# Patient Record
Sex: MALE | Race: WHITE | NOT HISPANIC OR LATINO | Employment: FULL TIME | URBAN - METROPOLITAN AREA
[De-identification: names, ages, dates, MRNs, and addresses within clinical notes are randomized per-mention and may not be internally consistent; named-entity substitution may affect disease eponyms.]

---

## 2018-01-28 ENCOUNTER — APPOINTMENT (OUTPATIENT)
Dept: CT IMAGING | Facility: HOSPITAL | Age: 53
End: 2018-01-28

## 2018-01-28 ENCOUNTER — HOSPITAL ENCOUNTER (EMERGENCY)
Facility: HOSPITAL | Age: 53
Discharge: HOME | End: 2018-01-28
Attending: FAMILY MEDICINE | Admitting: FAMILY MEDICINE

## 2018-01-28 VITALS
BODY MASS INDEX: 24.5 KG/M2 | OXYGEN SATURATION: 94 % | HEART RATE: 69 BPM | WEIGHT: 175 LBS | HEIGHT: 71 IN | SYSTOLIC BLOOD PRESSURE: 121 MMHG | TEMPERATURE: 98.7 F | DIASTOLIC BLOOD PRESSURE: 90 MMHG | RESPIRATION RATE: 16 BRPM

## 2018-01-28 DIAGNOSIS — R51.9 SCALP PAIN: Primary | ICD-10-CM

## 2018-01-28 DIAGNOSIS — R51.9 NONINTRACTABLE HEADACHE, UNSPECIFIED CHRONICITY PATTERN, UNSPECIFIED HEADACHE TYPE: ICD-10-CM

## 2018-01-28 LAB — GLUCOSE BLDC GLUCOMTR-MCNC: 176 MG/DL (ref 70–130)

## 2018-01-28 PROCEDURE — 70450 CT HEAD/BRAIN W/O DYE: CPT

## 2018-01-28 PROCEDURE — 82962 GLUCOSE BLOOD TEST: CPT

## 2018-01-28 PROCEDURE — 99283 EMERGENCY DEPT VISIT LOW MDM: CPT

## 2018-01-28 RX ORDER — CLONIDINE HYDROCHLORIDE 0.2 MG/1
0.2 TABLET ORAL 2 TIMES DAILY
COMMUNITY

## 2018-01-28 RX ORDER — INSULIN GLARGINE 100 [IU]/ML
INJECTION, SOLUTION SUBCUTANEOUS DAILY
COMMUNITY

## 2018-01-28 RX ORDER — CLONIDINE HYDROCHLORIDE 0.1 MG/1
0.1 TABLET ORAL ONCE
Status: COMPLETED | OUTPATIENT
Start: 2018-01-28 | End: 2018-01-28

## 2018-01-28 RX ORDER — GLIPIZIDE 10 MG/1
10 TABLET, FILM COATED, EXTENDED RELEASE ORAL DAILY
COMMUNITY

## 2018-01-28 RX ADMIN — CLONIDINE HYDROCHLORIDE 0.1 MG: 0.1 TABLET ORAL at 11:06

## 2018-12-10 ENCOUNTER — OFFICE VISIT (OUTPATIENT)
Dept: RETAIL CLINIC | Facility: CLINIC | Age: 53
End: 2018-12-10

## 2018-12-10 VITALS
RESPIRATION RATE: 16 BRPM | OXYGEN SATURATION: 97 % | TEMPERATURE: 98.7 F | DIASTOLIC BLOOD PRESSURE: 94 MMHG | HEART RATE: 91 BPM | SYSTOLIC BLOOD PRESSURE: 168 MMHG

## 2018-12-10 DIAGNOSIS — J40 BRONCHITIS: ICD-10-CM

## 2018-12-10 DIAGNOSIS — J32.1 FRONTAL SINUSITIS, UNSPECIFIED CHRONICITY: Primary | ICD-10-CM

## 2018-12-10 DIAGNOSIS — J32.0 MAXILLARY SINUSITIS, UNSPECIFIED CHRONICITY: ICD-10-CM

## 2018-12-10 PROCEDURE — 99203 OFFICE O/P NEW LOW 30 MIN: CPT | Performed by: NURSE PRACTITIONER

## 2018-12-10 RX ORDER — DOXYCYCLINE HYCLATE 100 MG
100 TABLET ORAL 2 TIMES DAILY
Qty: 20 TABLET | Refills: 0 | Status: SHIPPED | OUTPATIENT
Start: 2018-12-10 | End: 2018-12-20

## 2018-12-10 RX ORDER — ALBUTEROL SULFATE 90 UG/1
2 AEROSOL, METERED RESPIRATORY (INHALATION) EVERY 4 HOURS PRN
Qty: 6.7 G | Refills: 0 | Status: SHIPPED | OUTPATIENT
Start: 2018-12-10

## 2018-12-10 RX ORDER — METHYLPREDNISOLONE 4 MG/1
TABLET ORAL
Qty: 1 EACH | Refills: 0 | Status: SHIPPED | OUTPATIENT
Start: 2018-12-10

## 2018-12-10 NOTE — PROGRESS NOTES
Subjective:     Sergio Stanford is a 53 y.o.     Patient reports that wife has been sick in the last week with Bronchitis.        Sinusitis   This is a new problem. The current episode started in the past 7 days. The problem has been gradually worsening since onset. Associated symptoms include congestion, coughing, diaphoresis and sinus pressure. (Sweats, chest tightness) Past treatments include acetaminophen and oral decongestants. The treatment provided no relief.         The following portions of the patient's history were reviewed and updated as appropriate: allergies, current medications, past family history, past medical history, past social history, past surgical history and problem list.      Review of Systems   Constitutional: Positive for diaphoresis and fatigue.   HENT: Positive for congestion and sinus pressure.    Respiratory: Positive for cough.    Cardiovascular: Negative.          Objective:    Vitals:    12/10/18 1648   BP: 168/94   Pulse: 91   Resp: 16   Temp: 98.7 °F (37.1 °C)   SpO2: 97%       Physical Exam   Constitutional: He is oriented to person, place, and time. He appears well-developed and well-nourished. He has a sickly appearance.   HENT:   Head: Normocephalic.   Right Ear: No tenderness. Tympanic membrane is bulging. Tympanic membrane is not erythematous.   Left Ear: No tenderness. Tympanic membrane is bulging. Tympanic membrane is not erythematous.   Nose: Right sinus exhibits maxillary sinus tenderness and frontal sinus tenderness. Left sinus exhibits maxillary sinus tenderness and frontal sinus tenderness.   Mouth/Throat: Oropharynx is clear and moist and mucous membranes are normal.   Eyes: Conjunctivae are normal.   Cardiovascular: Normal rate, regular rhythm and normal heart sounds.   Pulmonary/Chest: Effort normal. He has wheezes.   Neurological: He is alert and oriented to person, place, and time.   Skin: Skin is warm and dry.   Psychiatric: He has a normal mood and affect. His  behavior is normal. Judgment and thought content normal.   Vitals reviewed.    Sergio is instructed to follow up with primary care if his symptoms do not improve, worsen, or new symptoms develop.      Sergio was seen today for sinusitis.    Diagnoses and all orders for this visit:    Frontal sinusitis, unspecified chronicity    Maxillary sinusitis, unspecified chronicity    Bronchitis    Other orders  -     MethylPREDNISolone (MEDROL, LOUIE,) 4 MG tablet; Take as directed on package instructions.  -     doxycycline (VIBRAMYICN) 100 MG tablet; Take 1 tablet by mouth 2 (Two) Times a Day for 10 days.  -     albuterol 108 (90 Base) MCG/ACT inhaler; Inhale 2 puffs Every 4 (Four) Hours As Needed for Wheezing or Shortness of Air.

## 2018-12-10 NOTE — PATIENT INSTRUCTIONS
Sinusitis, Adult  Sinusitis is soreness and inflammation of your sinuses. Sinuses are hollow spaces in the bones around your face. Your sinuses are located:  · Around your eyes.  · In the middle of your forehead.  · Behind your nose.  · In your cheekbones.    Your sinuses and nasal passages are lined with a stringy fluid (mucus). Mucus normally drains out of your sinuses. When your nasal tissues become inflamed or swollen, the mucus can become trapped or blocked so air cannot flow through your sinuses. This allows bacteria, viruses, and funguses to grow, which leads to infection.  Sinusitis can develop quickly and last for 7?10 days (acute) or for more than 12 weeks (chronic). Sinusitis often develops after a cold.  What are the causes?  This condition is caused by anything that creates swelling in the sinuses or stops mucus from draining, including:  · Allergies.  · Asthma.  · Bacterial or viral infection.  · Abnormally shaped bones between the nasal passages.  · Nasal growths that contain mucus (nasal polyps).  · Narrow sinus openings.  · Pollutants, such as chemicals or irritants in the air.  · A foreign object stuck in the nose.  · A fungal infection. This is rare.    What increases the risk?  The following factors may make you more likely to develop this condition:  · Having allergies or asthma.  · Having had a recent cold or respiratory tract infection.  · Having structural deformities or blockages in your nose or sinuses.  · Having a weak immune system.  · Doing a lot of swimming or diving.  · Overusing nasal sprays.  · Smoking.    What are the signs or symptoms?  The main symptoms of this condition are pain and a feeling of pressure around the affected sinuses. Other symptoms include:  · Upper toothache.  · Earache.  · Headache.  · Bad breath.  · Decreased sense of smell and taste.  · A cough that may get worse at night.  · Fatigue.  · Fever.  · Thick drainage from your nose. The drainage is often green and  it may contain pus (purulent).  · Stuffy nose or congestion.  · Postnasal drip. This is when extra mucus collects in the throat or back of the nose.  · Swelling and warmth over the affected sinuses.  · Sore throat.  · Sensitivity to light.    How is this diagnosed?  This condition is diagnosed based on symptoms, a medical history, and a physical exam. To find out if your condition is acute or chronic, your health care provider may:  · Look in your nose for signs of nasal polyps.  · Tap over the affected sinus to check for signs of infection.  · View the inside of your sinuses using an imaging device that has a light attached (endoscope).    If your health care provider suspects that you have chronic sinusitis, you may also:  · Be tested for allergies.  · Have a sample of mucus taken from your nose (nasal culture) and checked for bacteria.  · Have a mucus sample examined to see if your sinusitis is related to an allergy.    If your sinusitis does not respond to treatment and it lasts longer than 8 weeks, you may have an MRI or CT scan to check your sinuses. These scans also help to determine how severe your infection is.  In rare cases, a bone biopsy may be done to rule out more serious types of fungal sinus disease.  How is this treated?  Treatment for sinusitis depends on the cause and whether your condition is chronic or acute. If a virus is causing your sinusitis, your symptoms will go away on their own within 10 days. You may be given medicines to relieve your symptoms, including:  · Topical nasal decongestants. They shrink swollen nasal passages and let mucus drain from your sinuses.  · Antihistamines. These drugs block inflammation that is triggered by allergies. This can help to ease swelling in your nose and sinuses.  · Topical nasal corticosteroids. These are nasal sprays that ease inflammation and swelling in your nose and sinuses.  · Nasal saline washes. These rinses can help to get rid of thick mucus in  your nose.    If your condition is caused by bacteria, you will be given an antibiotic medicine. If your condition is caused by a fungus, you will be given an antifungal medicine.  Surgery may be needed to correct underlying conditions, such as narrow nasal passages. Surgery may also be needed to remove polyps.  Follow these instructions at home:  Medicines  · Take, use, or apply over-the-counter and prescription medicines only as told by your health care provider. These may include nasal sprays.  · If you were prescribed an antibiotic medicine, take it as told by your health care provider. Do not stop taking the antibiotic even if you start to feel better.  Hydrate and Humidify  · Drink enough water to keep your urine clear or pale yellow. Staying hydrated will help to thin your mucus.  · Use a cool mist humidifier to keep the humidity level in your home above 50%.  · Inhale steam for 10-15 minutes, 3-4 times a day or as told by your health care provider. You can do this in the bathroom while a hot shower is running.  · Limit your exposure to cool or dry air.  Rest  · Rest as much as possible.  · Sleep with your head raised (elevated).  · Make sure to get enough sleep each night.  General instructions  · Apply a warm, moist washcloth to your face 3-4 times a day or as told by your health care provider. This will help with discomfort.  · Wash your hands often with soap and water to reduce your exposure to viruses and other germs. If soap and water are not available, use hand .  · Do not smoke. Avoid being around people who are smoking (secondhand smoke).  · Keep all follow-up visits as told by your health care provider. This is important.  Contact a health care provider if:  · You have a fever.  · Your symptoms get worse.  · Your symptoms do not improve within 10 days.  Get help right away if:  · You have a severe headache.  · You have persistent vomiting.  · You have pain or swelling around your face or  eyes.  · You have vision problems.  · You develop confusion.  · Your neck is stiff.  · You have trouble breathing.  This information is not intended to replace advice given to you by your health care provider. Make sure you discuss any questions you have with your health care provider.  Document Released: 12/18/2006 Document Revised: 08/13/2017 Document Reviewed: 10/12/2016  [employer] Interactive Patient Education © 2018 [employer]  Acute Bronchitis, Adult  Acute bronchitis is sudden (acute) swelling of the air tubes (bronchi) in the lungs. Acute bronchitis causes these tubes to fill with mucus, which can make it hard to breathe. It can also cause coughing or wheezing.  In adults, acute bronchitis usually goes away within 2 weeks. A cough caused by bronchitis may last up to 3 weeks. Smoking, allergies, and asthma can make the condition worse. Repeated episodes of bronchitis may cause further lung problems, such as chronic obstructive pulmonary disease (COPD).  What are the causes?  This condition can be caused by germs and by substances that irritate the lungs, including:  · Cold and flu viruses. This condition is most often caused by the same virus that causes a cold.  · Bacteria.  · Exposure to tobacco smoke, dust, fumes, and air pollution.    What increases the risk?  This condition is more likely to develop in people who:  · Have close contact with someone with acute bronchitis.  · Are exposed to lung irritants, such as tobacco smoke, dust, fumes, and vapors.  · Have a weak immune system.  · Have a respiratory condition such as asthma.    What are the signs or symptoms?  Symptoms of this condition include:  · A cough.  · Coughing up clear, yellow, or green mucus.  · Wheezing.  · Chest congestion.  · Shortness of breath.  · A fever.  · Body aches.  · Chills.  · A sore throat.    How is this diagnosed?  This condition is usually diagnosed with a physical exam. During the exam, your health care provider may order  tests, such as chest X-rays, to rule out other conditions. He or she may also:  · Test a sample of your mucus for bacterial infection.  · Check the level of oxygen in your blood. This is done to check for pneumonia.  · Do a chest X-ray or lung function testing to rule out pneumonia and other conditions.  · Perform blood tests.    Your health care provider will also ask about your symptoms and medical history.  How is this treated?  Most cases of acute bronchitis clear up over time without treatment. Your health care provider may recommend:  · Drinking more fluids. Drinking more makes your mucus thinner, which may make it easier to breathe.  · Taking a medicine for a fever or cough.  · Taking an antibiotic medicine.  · Using an inhaler to help improve shortness of breath and to control a cough.  · Using a cool mist vaporizer or humidifier to make it easier to breathe.    Follow these instructions at home:  Medicines  · Take over-the-counter and prescription medicines only as told by your health care provider.  · If you were prescribed an antibiotic, take it as told by your health care provider. Do not stop taking the antibiotic even if you start to feel better.  General instructions  · Get plenty of rest.  · Drink enough fluids to keep your urine clear or pale yellow.  · Avoid smoking and secondhand smoke. Exposure to cigarette smoke or irritating chemicals will make bronchitis worse. If you smoke and you need help quitting, ask your health care provider. Quitting smoking will help your lungs heal faster.  · Use an inhaler, cool mist vaporizer, or humidifier as told by your health care provider.  · Keep all follow-up visits as told by your health care provider. This is important.  How is this prevented?  To lower your risk of getting this condition again:  · Wash your hands often with soap and water. If soap and water are not available, use hand .  · Avoid contact with people who have cold symptoms.  · Try  not to touch your hands to your mouth, nose, or eyes.  · Make sure to get the flu shot every year.    Contact a health care provider if:  · Your symptoms do not improve in 2 weeks of treatment.  Get help right away if:  · You cough up blood.  · You have chest pain.  · You have severe shortness of breath.  · You become dehydrated.  · You faint or keep feeling like you are going to faint.  · You keep vomiting.  · You have a severe headache.  · Your fever or chills gets worse.  This information is not intended to replace advice given to you by your health care provider. Make sure you discuss any questions you have with your health care provider.  Document Released: 01/25/2006 Document Revised: 07/12/2017 Document Reviewed: 06/07/2017  Elsevier Interactive Patient Education © 2018 [employer]vier Inc.

## 2023-12-11 ENCOUNTER — TELEPHONE (OUTPATIENT)
Dept: GASTROENTEROLOGY | Facility: CLINIC | Age: 58
End: 2023-12-11
Payer: COMMERCIAL

## 2023-12-11 NOTE — TELEPHONE ENCOUNTER
Got a message from [facility] today that the patient wanted to schedule a screening colonoscopy and I called and left a message

## 2025-03-24 ENCOUNTER — OFFICE VISIT (OUTPATIENT)
Dept: ENDOCRINOLOGY | Age: 60
End: 2025-03-24
Payer: COMMERCIAL

## 2025-03-24 VITALS
HEART RATE: 109 BPM | TEMPERATURE: 97.6 F | BODY MASS INDEX: 23.07 KG/M2 | DIASTOLIC BLOOD PRESSURE: 70 MMHG | OXYGEN SATURATION: 94 % | SYSTOLIC BLOOD PRESSURE: 132 MMHG | WEIGHT: 164.8 LBS | HEIGHT: 71 IN

## 2025-03-24 DIAGNOSIS — E78.2 MIXED HYPERLIPIDEMIA: ICD-10-CM

## 2025-03-24 DIAGNOSIS — E11.42 DIABETIC PERIPHERAL NEUROPATHY: ICD-10-CM

## 2025-03-24 DIAGNOSIS — E11.65 TYPE 2 DIABETES MELLITUS WITH HYPERGLYCEMIA, WITH LONG-TERM CURRENT USE OF INSULIN: Primary | ICD-10-CM

## 2025-03-24 DIAGNOSIS — Z79.4 TYPE 2 DIABETES MELLITUS WITH HYPERGLYCEMIA, WITH LONG-TERM CURRENT USE OF INSULIN: Primary | ICD-10-CM

## 2025-03-24 DIAGNOSIS — E11.319 DIABETIC RETINOPATHY ASSOCIATED WITH TYPE 2 DIABETES MELLITUS, MACULAR EDEMA PRESENCE UNSPECIFIED, UNSPECIFIED LATERALITY, UNSPECIFIED RETINOPATHY SEVERITY: ICD-10-CM

## 2025-03-24 DIAGNOSIS — I73.9 PVD (PERIPHERAL VASCULAR DISEASE): ICD-10-CM

## 2025-03-24 DIAGNOSIS — Z89.9 H/O AMPUTATION: ICD-10-CM

## 2025-03-24 PROCEDURE — 99204 OFFICE O/P NEW MOD 45 MIN: CPT | Performed by: NURSE PRACTITIONER

## 2025-03-24 RX ORDER — ASPIRIN 81 MG/1
81 TABLET ORAL DAILY
COMMUNITY

## 2025-03-24 RX ORDER — LEVOTHYROXINE SODIUM 75 UG/1
75 TABLET ORAL DAILY
COMMUNITY
Start: 2025-02-06

## 2025-03-24 RX ORDER — PANTOPRAZOLE SODIUM 40 MG/1
40 TABLET, DELAYED RELEASE ORAL DAILY
COMMUNITY
Start: 2025-02-06

## 2025-03-24 RX ORDER — INSULIN GLARGINE 100 [IU]/ML
35 INJECTION, SOLUTION SUBCUTANEOUS NIGHTLY
COMMUNITY

## 2025-03-24 NOTE — PROGRESS NOTES
"Chief Complaint  Diabetes    Subjective        Sergio Stanford presents to [facility] ENDOCRINOLOGY  History of Present Illness    Patient is seen in consult today as a new patient today sent by Bouchra Masters APRN for E11.9 (ICD-10-CM) - Diabetes     Stopped checking BS when he was having medication difficulties at the pharmacy   Food choices seem to be the biggest impact on his glucose control  He came today with hopes of stopping his diabetic medications and only using a once weekly injection like ozempic or mounjaro    His daughter is one of our pharmacist here and so he also wanted to be seen in our office to enroll in our pharmacy services to ensure timely and cost effective medication management     Diabetes Type 2  Age 37  CVA- denies   Retinopathy-yes, had treatments in the past. Also with glaucoma   Yearly eye exams- yes, going Friday   Thyroid disorder- yes, on levothyroxine 75mcg daily   CAD- denies  PAD: stent placement in his right legs and toe amputation (first 3 toes) due to poor circulation   Lung disorder- has MIGUEL A but does not use machine   Gastroparesis- denies   Pancreatitis- denies    complications- denies   Neuropathy- yes    Activity level- busy at work  Not interested in diabetes education   Recent hospitalization- yes 2 weeks ago s/p toe amputation         Objective   Vital Signs:  /70   Pulse 109   Temp 97.6 °F (36.4 °C) (Oral)   Ht 180.3 cm (71\")   Wt 74.8 kg (164 lb 12.8 oz)   SpO2 94%   BMI 22.98 kg/m²   Estimated body mass index is 22.98 kg/m² as calculated from the following:    Height as of this encounter: 180.3 cm (71\").    Weight as of this encounter: 74.8 kg (164 lb 12.8 oz).    BMI is within normal parameters. No other follow-up for BMI required.      Physical Exam  Vitals reviewed.   Constitutional:       General: He is not in acute distress.  HENT:      Head: Normocephalic and atraumatic.   Cardiovascular:      Rate and Rhythm: Normal rate. "   Pulmonary:      Effort: Pulmonary effort is normal. No respiratory distress.   Musculoskeletal:         General: No signs of injury. Normal range of motion.      Cervical back: Normal range of motion and neck supple.   Skin:     General: Skin is warm and dry.   Neurological:      Mental Status: He is alert and oriented to person, place, and time. Mental status is at baseline.   Psychiatric:         Mood and Affect: Mood normal.         Behavior: Behavior normal.         Thought Content: Thought content normal.         Judgment: Judgment normal.        Result Review :  The following data was reviewed by: HUSSEIN Milner on 03/24/2025:  Common labs          4/9/2024    11:41 3/4/2025    16:40   Common Labs   Total Cholesterol 181     158       Triglycerides 108     87       HDL Cholesterol 50     50       LDL Cholesterol  109     90          Details          This result is from an external source.                       Assessment and Plan   Diagnoses and all orders for this visit:    1. Type 2 diabetes mellitus with hyperglycemia, with long-term current use of insulin (Primary)    2. PVD (peripheral vascular disease)    3. H/O amputation    4. Mixed hyperlipidemia    5. Diabetic retinopathy associated with type 2 diabetes mellitus, macular edema presence unspecified, unspecified laterality, unspecified retinopathy severity    6. Diabetic peripheral neuropathy             Follow Up   No follow-ups on file.    Last A1c available, 9/2024 which is decently controlled- 7.2%  Patient's goal A1c is <7%  I do not think glp-1 therapy alone will replace metformin, insulin and jardiance but we can certainly work to this goal as best as we can  Discussed with patient that the best change of being able to have a controlled A1c on glp-1 therapy alone will come down to his willingness and committment to change his eating habits   Declined nutritional counseling at this time   Will work with pharmacy team on covered options  and given patient a plan to work towards medication regimen deescalation      Patient was given instructions and counseling regarding his condition or for health maintenance advice. Please see specific information pulled into the AVS if appropriate.     HUSSEIN Milner

## 2025-03-25 ENCOUNTER — DOCUMENTATION (OUTPATIENT)
Dept: ENDOCRINOLOGY | Age: 60
End: 2025-03-25
Payer: COMMERCIAL

## 2025-03-25 NOTE — PROGRESS NOTES
Left message to call office back regarding that his A1c was not drawn at his pcp.    let him know it was not done and see when he can stop by our office to get this checked in the next week or two please

## 2025-03-27 ENCOUNTER — TELEPHONE (OUTPATIENT)
Dept: ENDOCRINOLOGY | Age: 60
End: 2025-03-27
Payer: COMMERCIAL

## 2025-03-27 ENCOUNTER — SPECIALTY PHARMACY (OUTPATIENT)
Dept: ENDOCRINOLOGY | Age: 60
End: 2025-03-27
Payer: COMMERCIAL

## 2025-03-27 DIAGNOSIS — Z79.4 TYPE 2 DIABETES MELLITUS WITH HYPERGLYCEMIA, WITH LONG-TERM CURRENT USE OF INSULIN: Primary | ICD-10-CM

## 2025-03-27 DIAGNOSIS — E11.65 TYPE 2 DIABETES MELLITUS WITH HYPERGLYCEMIA, WITH LONG-TERM CURRENT USE OF INSULIN: Primary | ICD-10-CM

## 2025-03-27 NOTE — TELEPHONE ENCOUNTER
Caller: Sergio Stanford    Relationship: Self    Best call back number: [phone]    What is the best time to reach you: ANYTIME    Who are you requesting to speak with (clinical staff, provider,  specific staff member): CLINICAL STAFF    Do you know the name of the person who called: NARINDER (MED ASST)    What was the call regarding: PATIENT RETURNING CALL TO NARINDER REGARDING LAB DRAW

## 2025-03-28 NOTE — TELEPHONE ENCOUNTER
Specialty Pharmacy Patient Management Program  Prescription Refill Request     Patient currently fills medications at  Pharmacy. Needing refill(s) on the following:      Requested Prescriptions     Pending Prescriptions Disp Refills    Semaglutide,0.25 or 0.5MG/DOS, (Ozempic, 0.25 or 0.5 MG/DOSE,) 2 MG/3ML solution pen-injector 9 mL 0     Sig: Inject 0.25 mg under the skin into the appropriate area as directed 1 (One) Time Per Week for 28 days, THEN 0.5 mg 1 (One) Time Per Week for 70 days.    Continuous Glucose Sensor (FreeStyle Joshua 3 Plus Sensor) 6 each 0     Sig: Use Every 15 (Fifteen) Days.       Last visit: 03/24/25    Next visit: 06/24/25    Pended for HUSSEIN Milner to review, and approve if appropriate.       Iris Benjamin, PharmD, BCACP, BC-ADM, CDCES  Clinical Specialty Pharmacist, Endocrinology  3/28/2025  14:26 EDT

## 2025-03-28 NOTE — PROGRESS NOTES
Specialty Pharmacy       Sergio Stanford is a 59 y.o. male seen by an Endocrinology provider for Type 2 Diabetes.    Benefits Investigation Summary    Prescription: New Therapy/Renewal    Dispensing pharmacy: [facility]     Copay amount:   SGLT-2i  Jardiance: Too soon to fill until 4/14/25 - last filled 2/6/25 via mail  Patient has copay card to bring cost to $10/month   Farxiga: Not covered  Invokana: Not covered  GLP-1 RA or GIP/GLP-1 RA  Ozempic: $120 for first fill (42 day supply for 1 pen) then $60 for 1 month or $180 for 3 month  Could get copay card to bring cost to $25 for 1, 2 or 3 month supply   Trulicity: $25 for 1 month or $75 for 3 months   Could get copay card to bring cost to $25 for 1, 2 or 3 month supply   Mounjaro: $25 for 1 month or $50 for 2 month  3 month supply not covered   Could get copay card to bring cost to $25 for 1 or 2 month supply  Basal Insulin:  Basaglar: Too soon to fill until 4/3/25 - last filled 3/20/25 via mail  Copay card added to bring cost to $35/month    Tresiba U100: $120 for 15mL (42 day supply) or $180 for 30mL (85 day supply)  Copay card added to bring cost to $70  for 15mL (42 day supply) or $105 for 30mL (85 day supply)  Toujeo: $120 for 6 mL (51 day supply)   Lantus: Too soon to fill until 4/3/25 - last filled 3/20/25 via mail  CGM   Dexcom G7 sensors: $60 for 1 month or $180 for 3 month   FreeStyle Joshua 3+ Sensors: $37.57 for 1 month or $112.72 for 3 month       PLAN: [facility]  BIN: [id number]  PCN: [id number]  RX GROUP: [id number]    Prior Auth and Med Assistance notes: as above     Iris Benjamin, PharmD, BCACP, BC-ADM, CDCES  Clinical Specialty Pharmacist, Endocrinology  3/28/2025  08:14 EDT

## 2025-03-31 DIAGNOSIS — E11.65 TYPE 2 DIABETES MELLITUS WITH HYPERGLYCEMIA, WITH LONG-TERM CURRENT USE OF INSULIN: ICD-10-CM

## 2025-03-31 DIAGNOSIS — Z79.4 TYPE 2 DIABETES MELLITUS WITH HYPERGLYCEMIA, WITH LONG-TERM CURRENT USE OF INSULIN: ICD-10-CM

## 2025-03-31 LAB — HBA1C MFR BLD: 9.5 % (ref 4.8–5.6)

## 2025-03-31 RX ORDER — HYDROCHLOROTHIAZIDE 12.5 MG/1
1 CAPSULE ORAL
Qty: 6 EACH | Refills: 0 | Status: SHIPPED | OUTPATIENT
Start: 2025-03-31

## 2025-03-31 RX ORDER — SEMAGLUTIDE 0.68 MG/ML
INJECTION, SOLUTION SUBCUTANEOUS
Qty: 9 ML | Refills: 0 | Status: SHIPPED | OUTPATIENT
Start: 2025-03-31 | End: 2025-07-07

## 2025-04-01 ENCOUNTER — SPECIALTY PHARMACY (OUTPATIENT)
Dept: ENDOCRINOLOGY | Age: 60
End: 2025-04-01
Payer: COMMERCIAL

## 2025-04-01 PROBLEM — E11.65 TYPE 2 DIABETES MELLITUS WITH HYPERGLYCEMIA, WITH LONG-TERM CURRENT USE OF INSULIN: Status: ACTIVE | Noted: 2025-04-01

## 2025-04-01 PROBLEM — Z79.4 TYPE 2 DIABETES MELLITUS WITH HYPERGLYCEMIA, WITH LONG-TERM CURRENT USE OF INSULIN: Status: ACTIVE | Noted: 2025-04-01

## 2025-04-01 RX ORDER — SIMVASTATIN 40 MG
40 TABLET ORAL NIGHTLY
COMMUNITY
Start: 2025-02-11

## 2025-04-01 RX ORDER — INSULIN GLARGINE 100 [IU]/ML
35 INJECTION, SOLUTION SUBCUTANEOUS NIGHTLY
Qty: 30 ML | Refills: 0 | Status: SHIPPED | OUTPATIENT
Start: 2025-04-01

## 2025-04-01 NOTE — PROGRESS NOTES
Specialty Pharmacy Patient Management Program  Endocrinology Refill Outreach      Sergio is a 59 y.o. male contacted today regarding refills of his medication(s).    Specialty medication(s) and dose(s) confirmed: Ozempic (new start) and Combigan eye drops       Delivery Questions      Flowsheet Row Most Recent Value   Delivery method  at Pharmacy   Medication(s) being filled and delivered Semaglutide (Ozempic (0.25 or 0.5 MG/DOSE)), Brimonidine Tartrate-Timolol (COMBIGAN)   Copay verified? Yes   Copay amount $55  [Ozempic $25 with copay card + Combigan eye drops $20]   Copay form of payment Pay at pickup   Delivery Date Selection 04/03/25   Signature Required No            Follow-Up: 43 days     Iris Benjamin, PharmD, BCACP, BC-ADM, [facility]  Clinical Specialty Pharmacist, Endocrinology  4/1/2025  15:06 EDT

## 2025-04-01 NOTE — TELEPHONE ENCOUNTER
Specialty Pharmacy Patient Management Program  Prescription Refill Request     Patient currently fills medications at  Pharmacy. Needing refill(s) on the following:      Requested Prescriptions     Pending Prescriptions Disp Refills    Insulin Glargine (BASAGLAR KWIKPEN) 100 UNIT/ML injection pen 30 mL 0     Sig: Inject 35 Units under the skin into the appropriate area as directed Every Night.       Last visit: 03/24/25 4/1/25 A1c Lab Result Note -     Lab Results   Component Value Date    HGBA1C 9.50 (H) 03/31/2025   Next visit: 06/24/25    Pended for HUSSEIN Milner to review, and approve if appropriate.     Iris Benjamin PharmD, HERMESCP, BC-ADM, FLAQUITA  Clinical Specialty Pharmacist, Endocrinology  4/1/2025  14:11 EDT      Iris Benjamin PharmD, JESENIA, BC-ADM, FLAQUIAT  Clinical Specialty Pharmacist, Endocrinology  4/1/2025  14:07 EDT

## 2025-04-01 NOTE — PROGRESS NOTES
Specialty Pharmacy Patient Management Program  Endocrinology Initial Assessment     Sergio Stanford was referred by an Endocrinology provider to the Endocrinology Patient Management program offered by [facility] for Type 2 Diabetes on 04/01/25.  An initial outreach was conducted, including assessment of therapy appropriateness and specialty medication education for Jardiance and Ozempic. The patient was introduced to services offered by [facility], including: regular assessments, refill coordination, curbside pick-up or mail order delivery options, prior authorization maintenance, and financial assistance programs as applicable. The patient was also provided with contact information for the pharmacy team.     Insurance Coverage & Financial Support  [employer]   Copay cards obtained for insulin, Ozempic     Relevant Past Medical History and Comorbidities  Relevant medical history and concomitant health conditions were discussed with the patient. The patient's chart has been reviewed for relevant past medical history and comorbid conditions and updated as necessary.  Past Medical History:   Diagnosis Date    Diabetes mellitus     Hypertension      Social History     Socioeconomic History    Marital status:    Tobacco Use    Smoking status: Never    Smokeless tobacco: Current     Types: Chew   Vaping Use    Vaping status: Never Used   Substance and Sexual Activity    Alcohol use: No    Drug use: Defer    Sexual activity: Defer       Problem list reviewed by Iris Benjamin PharmD on 4/1/2025 at  2:02 PM    Allergies  Known allergies and reactions were discussed with the patient. The patient's chart has been reviewed for  allergy information and updated as necessary.   No Known Allergies    Allergies reviewed by Iris Benjamin PharmD on 4/1/2025 at  1:59 PM    Relevant Laboratory Values  Relevant laboratory values were discussed with the patient. The  following specialty medication dose adjustment(s) are recommended: A1c above goal  A1C Last 3 Results          3/31/2025    07:50   HGBA1C Last 3 Results   Hemoglobin A1C 9.50      Lab Results   Component Value Date    HGBA1C 9.50 (H) 03/31/2025     Lab Results   Component Value Date    CALCIUM 9.5 05/24/2022     05/24/2022    K 4 05/24/2022    CO2 26 05/24/2022     05/24/2022    BUN 16 05/24/2022    CREATININE 0.84 05/24/2022    EGFRIFAFRI >60 05/24/2022    EGFRIFNONA 80 12/23/2019    BCR 19 05/24/2022    ANIONGAP 9 05/24/2022     Lab Results   Component Value Date    CHLPL 158 03/04/2025    TRIG 87 03/04/2025    HDL 50 03/04/2025    LDL 90 03/04/2025         Current Medication List  This medication list has been reviewed with the patient and evaluated for any interactions or necessary modifications/recommendations, and updated to include all prescription medications, OTC medications, and supplements the patient is currently taking.  This list reflects what is contained in the patient's profile, which has also been marked as reviewed to communicate to other providers it is the most up to date version of the patient's current medication therapy.     Current Outpatient Medications:     aspirin 81 MG EC tablet, Take 1 tablet by mouth Daily., Disp: , Rfl:     CloNIDine (CATAPRES) 0.2 MG tablet, Take 1 tablet by mouth 2 (Two) Times a Day., Disp: , Rfl:     levothyroxine (SYNTHROID, LEVOTHROID) 75 MCG tablet, Take 1 tablet by mouth Daily., Disp: , Rfl:     LOSARTAN POTASSIUM PO, Take 100 mg by mouth Daily., Disp: , Rfl:     pantoprazole (PROTONIX) 40 MG EC tablet, Take 1 tablet by mouth Daily., Disp: , Rfl:     simvastatin (ZOCOR) 40 MG tablet, Take 1 tablet by mouth Every Night., Disp: , Rfl:     brimonidine-timolol (COMBIGAN) 0.2-0.5 % ophthalmic solution, Instill 1 drop into left eye twice a day, Disp: 5 mL, Rfl: 6    Continuous Glucose Sensor (FreeStyle Joshua 3 Plus Sensor), Use 1 each Every 15 (Fifteen)  Days., Disp: 6 each, Rfl: 0    empagliflozin (JARDIANCE) 25 MG tablet tablet, Take 1 tablet by mouth Daily., Disp: 90 tablet, Rfl: 0    Insulin Glargine (BASAGLAR KWIKPEN) 100 UNIT/ML injection pen, Inject 35 Units under the skin into the appropriate area as directed Every Night., Disp: 30 mL, Rfl: 0    metFORMIN (GLUCOPHAGE) 1000 MG tablet, Take 1 tablet by mouth 2 (Two) Times a Day With Meals., Disp: 180 tablet, Rfl: 0    Semaglutide,0.25 or 0.5MG/DOS, (Ozempic, 0.25 or 0.5 MG/DOSE,) 2 MG/3ML solution pen-injector, Inject 0.25 mg under the skin into the appropriate area as directed 1 (One) Time Per Week for 28 days, THEN 0.5 mg 1 (One) Time Per Week for 70 days., Disp: 9 mL, Rfl: 0    Medicines reviewed by Iris Benjamin, PharmD on 4/1/2025 at  2:01 PM  Medicines reviewed by Iris Benjamin PharmD on 4/1/2025 at  2:26 PM    Drug Interactions  No Clinically Significant DDIs Were Identified at Present Time Upon Marking Medications Reviewed     Recommended Medications Assessment  Aspirin: Currently Taking   Statin: Currently Taking   ACEi/ARB: Currently Taking     Initial Education Provided for Specialty Medication  The patient has been provided with the following education and any applicable administration techniques (i.e. self-injection) have been demonstrated for the therapies indicated. All questions and concerns have been addressed prior to the patient receiving the medication, and the patient has verbalized comprehension of the education and any materials provided. Additional patient education shall be provided and documented upon request by the patient, provider, or payer.    Ozempic® (semaglutide)  Medication Expectations   Why am I taking this medication? You are taking Ozempic, along with diet and exercise, to lower blood sugar because you have type 2 diabetes. You may also be taking Ozempic if you have diabetes and cardiovascular disease to reduce your risk of heart attack or stroke.    What should I  expect while on this medication? You should expect to see your blood sugar, A1c and possibly weight decrease over time.   How does the medication work? Ozempic is a non-insulin injection that works with your body to release insulin in response to your blood sugar rising.  This medication also slows down food from leaving your stomach, making you feel case for longer.   How long will I be on this medication for? The amount of time you will be on this medication will be determined by your doctor based on blood sugar and A1c control. You will most likely be on this medication or another diabetes medication throughout your lifetime. Do not abruptly stop this medication without talking to your doctor first.    How do I take this medication? Take as directed on your prescription label. Ozempic is injected under the skin (subcutaneously) of your stomach, thigh or upper arm.  Use this medication once weekly, on the same day each week, and it can be given with or without food.    What are some possible side effects? You may notice you don't feel as hungry, especially when you first start using Ozempic.  The most common side effects are nausea, stomach cramping, and constipation. Stop using Ozempic and call your doctor immediately if you have severe pain in your stomach area that will not go away.    What happens if I miss a dose? If you miss a dose, take it as soon as you remember as long as it is within 5 days after your missed dose.  If more than 5 days have passed, skip the missed dose and resume Ozempic on the regularly scheduled day.     Medication Safety   What are things I should warn my doctor immediately about? Do not use Ozempic if you or a family member have ever had medullary thyroid cancer (MTC) or Multiple Endocrine Neoplasia syndrome type 2 (MEN 2).  Tell your doctor if you have or have had problems with your kidneys or pancreas, or if you are pregnant, planning to become pregnant. Stop using Ozempic and get  medical help right away if you have severe pain that will not go away, or if you notice any signs/symptoms of an allergic reaction (rash, hives, difficulty breathing, etc.).    What are things that I should be cautious of? Be cautious of any side effects from this medication. Talk to your doctor if any new ones develop or aren't getting better.    What are some medications that can interact with this one? Taking Ozempic with other medications that also lower your blood sugar such as insulin and glipizide/glimepiride/glyburide may increase the risk of low blood sugar. Your doctor may reduce the dose of these medications when you start Ozempic.  Always tell your doctor or pharmacist immediately if you start taking any new medications, including over-the-counter medications, vitamins, and herbal supplements.       Medication Storage/Handling   How should I handle this medication? Keep this medication out of reach of pets/children and keep the pen capped    How does this medication need to be stored? Store in the refrigerator prior to first use, but do not freeze.  After first use, you may continue to store in the refrigerator or at room temperature for 56 days.  Protect from excessive heat and sunlight.   How should I dispose of this medication? Used Ozempic pens should be thrown away after 56 days, even if medication remains. If your doctor decides to stop this medication, take to your local police station for proper disposal. Some pharmacies also have take-back bins for medication drop-off.      Resources/Support   How can I remind myself to take this medication? You can download reminder apps to help you manage your refills. You may also set an alarm on your phone to remind you.    Is financial support available?  [employer] can provide co-pay cards if you have commercial insurance or patient assistance if you have Medicare or no insurance.    Which vaccines are recommended for me? Talk to your doctor about these  vaccines: Flu, Coronavirus (COVID-19), Pneumococcal (pneumonia), Tdap, Hepatitis B, Zoster (shingles)       JARDIANCE® (empagliflozin)  Medication Expectations   Why am I taking this medication? You could be taking this medication for several reasons:  To lower blood sugar because you have type 2 diabetes  To reduce your risk of death from heart attack or stroke if you have heart disease and type 2 diabetes  To reduce your risk of death or hospitalization for heart failure  To reduce your risk of further kidney damage, death, or hospitalization if you have chronic kidney disease   What should I expect while on this medication? You should expect to see your blood sugar and A1c decrease over time if you have diabetes. You may also see a decrease in your blood pressure and it can help some people lose weight.     How does the medication work? Jardiance works by helping to remove some sugar that the body doesn't need through urination.    How long will I be on this medication for? The amount of time you will be on this medication will be determined by your doctor based on blood sugar and A1c control. You will most likely be on this medication or another diabetes medication throughout your lifetime. Do not abruptly stop this medication without talking to your doctor first.    How do I take this medication? Take as directed on your prescription label. This medication is usually taken in the morning and can be given with or without food.    What are some possible side effects? You may notice increased urination, especially when you first start Jardiance. The most common side effects are urinary tract infections and yeast infections and are more commonly seen in females. Talk with your doctor if you notice white or yellow vaginal discharge, vaginal itching or odor of if you notice redness, itching, pain, or swelling of the penis and/or bad-smelling discharge from the penis.    What happens if I miss a dose? If you miss a  dose, take it as soon as you remember. If it is close to your next dose, skip it (do not take 2 doses at once)     Medication Safety   What are things I should warn my doctor immediately about? Tell your doctor if you have kidney disease, liver disease, heart failure, pancreas problems, or history of frequent genital yeast or urinary tract infections. Tell your doctor if you are on a low-salt diet, if you drink alcohol, or if you are having surgery. Talk to your doctor if you are pregnant, planning to become pregnant, or breastfeeding. Also tell your doctor if you notice any signs/symptoms of an allergic reaction (rash, hives, difficulty breathing, etc.).   What are things that I should be cautious of? Be cautious of any side effects from this medication. Talk to your doctor if any new ones develop or aren't getting better.   What are some medications that can interact with this one? Some medications that interact include diuretics (water pills) and other medications that may also lower your blood sugar such as insulins and glipizide/glimepiride/glyburide. Your doctor may reduce the dose of these medications when you start Jardiance to minimize low blood sugars. Always tell your doctor or pharmacist immediately if you start taking any new medications, including over-the-counter medications, vitamins, and herbal supplements.      Medication Storage/Handling   How should I handle this medication? Keep this medication out of reach of pets/children in tightly sealed container   How does this medication need to be stored? Store at room temperature and keep dry (don't keep in bathroom or other room with moisture)   How should I dispose of this medication? There should not be a need to dispose of this medication unless your provider decides to change the dose or therapy. If that is the case, take to your local police station for proper disposal. Some pharmacies also have take-back bins for medication drop-off.       Resources/Support   How can I remind myself to take this medication? You can download reminder apps to help you manage your refills. You may also set an alarm on your phone to remind you. The pharmacy carries pill boxes that you can place next to an area you pass everyday (such as where you place your car keys or where you charge your phone)   Is financial support available?  [employer] can provide co-pay cards if you have commercial insurance or patient assistance if you have Medicare or no insurance.    Which vaccines are recommended for me? Talk to your doctor about these vaccines: Flu, Coronavirus (COVID-19), Pneumococcal (pneumonia), Tdap, Hepatitis B, Zoster (shingles)       insulin glargine  Medication Expectations    Why am I taking this medication?  You are taking this medication to lower blood sugar and A1c because you have type 1 or type 2 diabetes. Diabetes is not curable but with proper medication and treatment, we can keep your blood sugar within your personalized target range.    What should I expect while on this medication?  You should expect to see your blood sugar and A1c decrease over time.    How does the medication work?  Insulin glargine (Basaglar, Lantus, Semglee, etc.) is a long-acting insulin that releases slowly and continuously in the body. Insulin helps the sugar in your blood get into cells and provide them with energy to fuel your body.     How long will I be on this medication for?  If you have Type 1 diabetes, you will be on this medication or another insulin throughout your lifetime because your body cannot make its own insulin. If you have Type 2 diabetes, the amount of time you will be on this medication will be determined by your doctor based on blood sugar and A1c control. You will most likely be on this medication or another diabetes medication throughout your lifetime because your body does not make enough insulin. Do not abruptly stop this medication  without talking to your doctor first.     How do I take this medication?  Take as directed on your prescription label. Insulin glargine is usually given once or twice daily and can be taken with or without food. Insulin glargine  is injected under the skin (subcutaneously) of your stomach, thigh, buttocks, or upper arm. Use a different injection site in the same body region each day.     What are some possible side effects?  Insulin glargine  may cause low blood sugar (hypoglycemia). Signs and symptoms that may indicate hypoglycemia include dizziness, sweating, anxiety, irritability, confusion, or headache.    What happens if I miss a dose?  If you miss a dose, take it as soon as you remember. If it is close to your next dose, skip it. Never take 2 doses at once.       Medication Safety    What are things I should warn my doctor immediately about?  Tell your doctor if you have kidney or liver problems. Talk to your doctor if you are pregnant, planning to become pregnant, or breastfeeding. Also tell your doctor if you notice any signs/symptoms of an allergic reaction (rash, hives, difficulty breathing, etc.).    What are things that I should be cautious of?  Be cautious of any side effects from this medication. Talk to your doctor if any new ones develop or aren't getting better.    What are some medications that can interact with this one?  Do not take this medication with rosiglitazone or macimorelin. Taking insulin glargine with other medications that lower your blood sugar may increase the risk of hypoglycemia. Your doctor may reduce the dose of these medications when you start insulin glargine  Always tell your doctor or pharmacist immediately if you start taking any new medications, including over-the-counter medications, vitamins, and herbal supplements.        Medication Storage/Handling    How should I handle this medication?  Keep this medication out of reach of pets/children and keep the pen capped when  not in use. Do not share your medicine with others.     How does this medication need to be stored?  Store your new, unused pens in the original carton in the refrigerator. Protect it from light. Do not freeze. Always remove the needle and place the cap on the pen before storing.     How should I dispose of this medication?  Used insulin glargine  pens should be thrown away after 28 days even if insulin remains.?Place your used pen and needle in an approved sharps container?If your doctor decides to stop this medication, take to your local police station for proper disposal. Some pharmacies also have?take-back bins for medication drop-off.??       Resources/Support    How can I remind myself to take this medication?  You can download reminder apps to help you manage your refills or set an alarm on your phone to remind you.     Is financial support available?   Manufacturers of insulin glargine  can provide co-pay cards if you have commercial insurance or patient assistance if you have Medicare or no insurance.     Which vaccines are recommended for me?  Talk to your doctor about these vaccines: Flu, Coronavirus (COVID-19), Pneumococcal (pneumonia), Tdap, Hepatitis B, Zoster (shingles)            Adherence and Self-Administration  Adherence related to the patient's specialty therapy was discussed with the patient. The Adherence segment of this outreach has been reviewed and updated.     Is there a concern with patient's ability to self administer the medication correctly and without issue?: No  Were any potential barriers to adherence identified during the initial assessment or patient education?: No  Are there any concerns regarding the patient's understanding of the importance of medication adherence?: No  Methods for Supporting Patient Adherence and/or Self-Administration: No known barriers at this time     Open Medication Therapy Problems  No medication therapy recommendations to display    Goals of  Therapy  Goals related to the patient's specialty therapy were discussed with the patient. The Patient Goals segment of this outreach has been reviewed and updated.   Goals Addressed Today        Specialty Pharmacy General Goal      Clinical Goal: Achieve  A1 <7%    Baseline Value:  Lab Results   Component Value Date    HGBA1C 9.50 (H) 03/31/2025        Progress:  Date A1c    04/01/2025  New Enrollment - A1c above goal - pt reports last A1c was ~7.5% 3-4 months ago - patient instructed to:  START Ozempic 0.25mg weekly for 4 weeks; then INCREASE to Ozempic 0.5mg weekly   CONTINUE Jardiance 25mg daily in the morning  CONTINUE Basaglar 35 units daily   CONTINUE Metformin 1000mg twice daily   03/31/2025 9.50 (H)                 Reassessment Plan & Follow-Up  1. Medication Therapy Changes: START Ozempic 0.25mg weekly for 4 weeks; then INCREASE to Ozempic 0.5mg weekly   2. Related Plans, Therapy Recommendations, or Therapy Problems to Be Addressed:   CONTINUE Jardiance 25mg daily in the morning  CONTINUE Basaglar 35 units daily   CONTINUE Metformin 1000mg twice daily  3. Pharmacist to perform regular assessments no more than (6) months from the previous assessment.  4. Care Coordinator to set up future refill outreaches, coordinate prescription delivery, and escalate clinical questions to pharmacist.  5. Welcome information and patient satisfaction survey to be sent by specialty pharmacy team with patient's initial fill.    Attestation  Therapeutic appropriateness: Appropriate   I attest the patient was actively involved in and has agreed to the above plan of care. If the prescribed therapy is at any point deemed not appropriate based on the current or future assessments, a consultation will be initiated with the patient's specialty care provider to determine the best course of action. The revised plan of therapy will be documented along with any required assessments and/or additional patient education provided.      Iris Benjamin, PharmD, BCACP, BC-ADM, [facility]  Clinical Specialty Pharmacist, Endocrinology  4/1/2025  15:02 EDT    Discussed the aforementioned information with the patient via Telephone.

## 2025-04-16 ENCOUNTER — SPECIALTY PHARMACY (OUTPATIENT)
Dept: ENDOCRINOLOGY | Age: 60
End: 2025-04-16
Payer: COMMERCIAL

## 2025-04-16 NOTE — PROGRESS NOTES
Specialty Pharmacy Patient Management Program  Endocrinology Initial Fill Outreach      Sergio is a 59 y.o. male contacted today regarding initial fill of his medication(s).    Specialty medication(s) and dose(s) confirmed: FreeStyle Joshua 3 PLUS CGM Sensors     Delivery Questions      Flowsheet Row Most Recent Value   Delivery method  at Pharmacy   Number of medications in delivery 1   Medication(s) being filled and delivered Continuous Glucose Sensor (FreeStyle Joshua 3 Plus Sensor)   Copay verified? Yes   Copay amount $112.72   Copay form of payment Pay at pickup   Delivery Date Selection 04/17/25   Signature Required No            Follow-Up: 83 days     Iris Benjamin, PharmD, BCACP, BC-ADM, [facility]  Clinical Specialty Pharmacist, Endocrinology  4/16/2025  10:58 EDT

## 2025-04-16 NOTE — PROGRESS NOTES
"   Specialty Pharmacy Patient Management Program  One-Time Clinical Outreach     Sergio Stanford is a 59 y.o. male seen by an Endocrinology provider for Type 2 Diabetes and enrolled in the Endocrinology Patient Management program offered by [facility].      Contacted patient to discuss tolerability of Ozempic following initiation. Patient reports taking 2 doses of Ozempic 0.25mg so far and has tolerated well without adverse effects. . Patient states they feel comfortable continuing with plan to increase to Ozempic 0.5mg weekly (after 4 doses of 0.25mg).      Plan:  CONTINUE Ozempic 0.25mg weekly for 2 more weeks (total of 4 doses) then INCREASE to Ozempic 0.5mg weekly      Patient does note that he has self adjusted his Metformin dose, decreasing to 1000mg daily (from 1000mg twice daily) due to muscle aches. He reported muscle aches in his shoulders, espeically at night when laying down to sleep, but this has \"greatly improved\" but not fully resolved with Metformin dose decrease. Will inform provider.    All questions answered, patient in agreement with plan, and patient expressed understanding .    Iris Benjamin, PharmD, BCACP, BC-ADM, [facility]  Clinical Specialty Pharmacist, Endocrinology  4/16/2025  10:40 EDT  "

## 2025-04-17 ENCOUNTER — SPECIALTY PHARMACY (OUTPATIENT)
Dept: ENDOCRINOLOGY | Age: 60
End: 2025-04-17
Payer: COMMERCIAL

## 2025-04-17 NOTE — PROGRESS NOTES
Specialty Pharmacy Patient Management Program  One-Time Clinical Outreach     Sergio Stanford is a 59 y.o. male seen by an Endocrinology provider for Type 2 Diabetes and enrolled in the Endocrinology Patient Management program offered by [facility].      Patient is seen today for training and eduction about the use of FreeStyle Joshua 3 + system and application technique. The purpose of the continuous glucose monitor in disease management explained.     Reviewed warnings/precautions and the following keys to success:   Understanding differences in sensor (plasma) glucose versus blood glucose.   Reviewed use of sensor glucose when making treatment decisions - importance of checking blood glucose when instructed by device, and when the patient thinks reading are not correct or is not feeling well.   Talked through the steps of applying sensor and cleaning back of the upper arm. Reviewed approved insertion sites for sensors.   Discussed the 60 minute period and advised patient to continue the use of fingerstick glucose checks to monitor sugar during this time period.   Explained the importance of leaving the bluetooth on to ensure connectivity   The sensor automatically stops working after 15 and must be replaced.  Discuss how to add a note/event, understanding of trend arrows and using CGM for treatment decisions.   Reviewed steps to changing sensor in 15 days  If patient is not comfortable changing the sensor the first time they should contact the clinic to schedule time to come back for assistance.   Educated the patient about removal of sensor before MRI and CT scans.   Encouraged patient to keep phone within 33 feet from sensor.     Arms set for Low of 70mg/dL and high of 250mg/dL. Patient will be using phone.     Application technique reviewed and patient demonstrated without difficulty.      Iris Benjamin, PharmD, BCACP, BC-ADM, CDCES  Clinical Specialty Pharmacist,  Endocrinology  4/17/2025  16:23 EDT

## 2025-05-05 ENCOUNTER — SPECIALTY PHARMACY (OUTPATIENT)
Dept: ENDOCRINOLOGY | Age: 60
End: 2025-05-05
Payer: COMMERCIAL

## 2025-05-05 NOTE — PROGRESS NOTES
Specialty Pharmacy Patient Management Program  One-Time Clinical Outreach     Sergio Stanford is a 59 y.o. male seen by an Endocrinology provider for Type 2 Diabetes and enrolled in the Endocrinology Patient Management program offered by [facility].      Contacted patient to discuss tolerability/efficacy of FreeStyle Joshua 3 PLUS CGM sensors  following initiation. Patient reports using the sensors for ~2 weeks without issue. Patient states they feel comfortable continuing with use at this time.     All questions answered, patient in agreement with plan, and patient expressed understanding .    Iris Benjamin, PharmD, BCACP, BC-ADM, [facility]  Clinical Specialty Pharmacist, Endocrinology  5/5/2025  10:57 EDT

## 2025-05-06 ENCOUNTER — SPECIALTY PHARMACY (OUTPATIENT)
Dept: ENDOCRINOLOGY | Age: 60
End: 2025-05-06
Payer: COMMERCIAL

## 2025-05-06 NOTE — PROGRESS NOTES
Specialty Pharmacy Patient Management Program  Refill Outreach     Sergio was contacted today regarding refills of their medication(s).    Refill Questions      Flowsheet Row Most Recent Value   Changes to allergies? No   Changes to medications? No   New conditions or infections since last clinic visit No   Unplanned office visit, urgent care, ED, or hospital admission in the last 4 weeks  No   How does patient/caregiver feel medication is working? Good   Financial problems or insurance changes  No   Since the previous refill, were any specialty medication doses or scheduled injections missed or delayed?  No   Does this patient require a clinical escalation to a pharmacist? No            Delivery Questions      Flowsheet Row Most Recent Value   Delivery method  at Pharmacy  [ at pharmacy 5-6-5-9, no later than Friday]   Medication(s) being filled and delivered Empagliflozin (JARDIANCE)   Doses left of specialty medications 1 week   Copay verified? Yes   Copay amount $10.00   Copay form of payment Pay at pickup                 Follow-up: 83 day(s)     Maria Elena Smith, Pharmacy Technician  5/6/2025  09:53 EDT

## 2025-05-14 ENCOUNTER — SPECIALTY PHARMACY (OUTPATIENT)
Dept: ENDOCRINOLOGY | Age: 60
End: 2025-05-14
Payer: COMMERCIAL

## 2025-05-14 NOTE — PROGRESS NOTES
Specialty Pharmacy Patient Management Program  Endocrinology Refill Outreach      Sergio is a 59 y.o. male contacted today regarding refills of his medication(s).    Specialty medication(s) and dose(s) confirmed: Brimonidine/Timolol Eye drops       Delivery Questions      Flowsheet Row Most Recent Value   Delivery method UPS   Delivery address verified with patient/caregiver? Yes   Delivery address Home   Number of medications in delivery 1   Medication(s) being filled and delivered Brimonidine Tartrate-Timolol (COMBIGAN)   Copay verified? Yes   Copay amount $20.00   Copay form of payment Credit/debit on file   Delivery Date Selection 05/15/25   Signature Required No            Follow-Up: 43 days     Iris Benjamin, PharmD, BCACP, BC-ADM, CDCES  Clinical Specialty Pharmacist, Endocrinology  5/14/2025  10:52 EDT

## 2025-05-14 NOTE — TELEPHONE ENCOUNTER
Specialty Pharmacy Patient Management Program  Prescription Refill Request     Patient currently fills medications at  Pharmacy. Needing refill(s) on the following:      Requested Prescriptions     Pending Prescriptions Disp Refills    metFORMIN (GLUCOPHAGE) 1000 MG tablet 180 tablet 0     Sig: Take 1 tablet by mouth 2 (Two) Times a Day With Meals.       Last visit: 03/24/25  Next visit: 06/24/25    Patient would like sent back to [facility] as he is able to get this for no charge from their pharmacy. Pended for HUSSEIN Milner to review, and approve if appropriate.       Iris Benjamin, PharmD, BCACP, BC-ADM, [facility]  Clinical Specialty Pharmacist, Endocrinology  5/14/2025  10:53 EDT

## 2025-05-14 NOTE — PROGRESS NOTES
Specialty Pharmacy Patient Management Program  One-Time Clinical Outreach     Sergio Stanford is a 59 y.o. male seen by an Endocrinology provider for Type 2 Diabetes and enrolled in the Endocrinology Patient Management program offered by [facility].      Contacted patient to discuss dose increase of Ozempic to 0.5mg weekly (from 0.25mg weekly). Patient has now taken 6 doses of 0.25mg but forgot to increase the dose after 4 doses. Patinet will increase dose of Ozempic to 0.5mg on Friday, May 16th.     Patient is currently achieving good glycemic control, meeting time in range goals based on ADA Standards of Care. With an increase in Ozempic to the therapeutic dose, this could increase risk for hypoglycemia. Will discuss dose adjustment for basal insulin with provider to help reduce risk.     Current Diabetes Medication Regimen:   Basaglar 35 units daily in the evening   Jardiance 25mg daily in the morning   Metformin 1000mg twice daily with meals   Ozempic 0.25mg weekly - to INCREASE to 0.5mg weekly on 5/16      Iris Benjamin, PharmD, BCACP, BC-ADM, [facility]  Clinical Specialty Pharmacist, Endocrinology  5/14/2025  10:58 EDT

## 2025-05-14 NOTE — PROGRESS NOTES
Specialty Pharmacy Patient Management Program  One-Time Clinical Outreach     Sergio Stanford is a 59 y.o. male seen by an Endocrinology provider for Type 2 Diabetes and enrolled in the Endocrinology Patient Management program offered by [facility].      Open Medication Therapy Problems  Type 2 diabetes mellitus with hyperglycemia, with long-term current use of insulin   1 Current Medication: Insulin Glargine (BASAGLAR KWIKPEN) 100 UNIT/ML injection pen   Current Medication Sig: Inject 35 Units under the skin into the appropriate area as directed Every Night.   Rationale: Dose too high - Dosage too high - Safety   Recommendation: Decrease Dose   Identified Date: 5/14/2025   Note: 5/14/2025  - 10:58 EDT - phone call with patient   Contacted patient to discuss dose increase of Ozempic to 0.5mg weekly (from 0.25mg weekly). Patient has now taken 6 doses of 0.25mg but forgot to increase the dose after 4 doses. Patinet will increase dose of Ozempic to 0.5mg on Friday, May 16th.     Patient is currently achieving good glycemic control, meeting time in range goals based on ADA Standards of Care. With an increase in Ozempic to the therapeutic dose, this could increase risk for hypoglycemia. Will discuss dose adjustment for basal insulin with provider to help reduce risk.     Current Diabetes Medication Regimen:   Basaglar 35 units daily in the evening   Jardiance 25mg daily in the morning   Metformin 1000mg twice daily with meals   Ozempic 0.25mg weekly - to INCREASE to 0.5mg weekly on 5/16        _________________________________________________________________     5/14/25 12:05 PM - Me to Michelle Jackson APRN  Pt will be increasing Ozempic to therapeutic dose of 0.5mg/week   He's already getting good BG control based on CGM report - full report in media tab   His basal insulin is most likely to cause hypoglycemia and he's already had a few instances of overnight lows - Would you consider a 20%  decrease in Basaglar dose to 28 units daily (from 35units daily)? We can follow-up with patient to inform him of your recommendations and in 2 weeks to assess tolerability/efficacy of any adjustments made   _________________________________________________________________     5/14/25  1:42 PM  - Michelle Jackson APRN to Me    Yes, I think that would be reasonable  Id maybe even recommend only 20u of basaglar as we could go back up if needed but we can see how he does on a lower dose of insulin and higher dose of ozempic     _________________________________________________________________    5/14/25  2:12 PM - Me to Michelle Jackson APRN    Update: Patient reports using Basaglar 20-24 units daily for at least the last 2 weeks due to lows overnight. Recommended he decrease further starting Friday (date of Ozempic dose increase) to 16 units/day (30% decrease). I let him know I'd check with you and see if this dose was okay and update him if there was any changes?  _________________________________________________________________     5/14/25  3:08 PM - Michelle Jackson APRN to  Yesy Velazquez     Yes that sounds good, he can also stop it all together if he wants to see how he does without it   Either option is good     _________________________________________________________________   5/14/25 - 15:50pm - phone call with patient   Contacted patient to review recommendations from provider. Patient would like to stay on Basaglar and feels comfortable with decrease to Basaglar 16units daily. Will contact patient in 2 weeks for tolerability/efficacy check but advised him to reach out to us sooner if needed, especially if he experiences low blood sugars.     Starting Friday 5/16 - Plan:  INCREASE to Ozempic 0.5mg weekly   DECREASE Basaglar to 16 units daily in the evening   CONTINUE Jardiance 25mg daily in the morning   CONTINUE Metformin 1000mg twice daily with meals     All questions answered, patient in  agreement with plan, and patient expressed understanding .             Iris Benjamin, PharmD, BCACP, BC-ADM, [facility]  Clinical Specialty Pharmacist, Endocrinology  5/14/2025  15:59 EDT

## 2025-05-28 ENCOUNTER — SPECIALTY PHARMACY (OUTPATIENT)
Dept: ENDOCRINOLOGY | Age: 60
End: 2025-05-28
Payer: COMMERCIAL

## 2025-05-28 ENCOUNTER — TREATMENT (OUTPATIENT)
Dept: ENDOCRINOLOGY | Age: 60
End: 2025-05-28
Payer: COMMERCIAL

## 2025-05-28 DIAGNOSIS — Z79.4 TYPE 2 DIABETES MELLITUS WITH HYPERGLYCEMIA, WITH LONG-TERM CURRENT USE OF INSULIN: Primary | ICD-10-CM

## 2025-05-28 DIAGNOSIS — E11.65 TYPE 2 DIABETES MELLITUS WITH HYPERGLYCEMIA, WITH LONG-TERM CURRENT USE OF INSULIN: Primary | ICD-10-CM

## 2025-05-28 PROCEDURE — 95251 CONT GLUC MNTR ANALYSIS I&R: CPT

## 2025-05-28 NOTE — Clinical Note
Good BG control based on CGM report  High: 181 - 250 mg/dL --- 24% Target Range: 70 - 180 mg/dL --- 76%  Some lows - lowest of 59mg/dL overnight - pt doesn't recall this event or any contributing factors  Adjusts basal insulin based on PM snack - recommended using consistent dose   Has used 2 doses of Ozempic 0.5mg - tolerating well without adverse effects  Could increase to Ozempic 1mg weekly and decrease Basaglar to 9 units daily?  Pt seeing you in 1 month - 6/24/25 - could also wait until then to adjust - has 5-6 weeks supply of Ozempic 0.5mg at home

## 2025-05-28 NOTE — PROGRESS NOTES
Specialty Pharmacy Patient Management Program  One-Time Clinical Outreach     Sergio Stanford is a 59 y.o. male seen by an Endocrinology provider for Type 2 Diabetes and enrolled in the Endocrinology Patient Management program offered by [facility].      Contacted patient to discuss tolerability of Ozempic following dose increase. Patient reports taking 2 doses of Ozempic 0.5mg so far and has tolerated well without adverse effects. . Patient states they feel comfortable continuing with Ozempic 0.5mg at this time.    Patient has had some hypoglycemia with BG as low as 59mg/dL. Patient states he's been using variable amounts of Basaglar depending on what he's eating, for example, if having more sugary snacks in the evening he will use a higher dose. He states most often he is using Basaglar 14 units daily before bed (~9pm). Recommended against adjusting basal insulin dose based on evening snack given duration of action. Patient did have a low blood sugar event this morning while driving to work and had to stop at a gas station to get a snack and reports only using Basaglar 10units yesterday evening. Patient agreeable to using Basaglar 12 units daily.    Patient is achieving good glycemic control and meeting goals for time in range as recommended by the American Diabetes Association Standards of Care. Will review CGM report as well as diabetes medication regimen to see if provider would recommend increase in Ozempic dose to 1mg weekly (from 0.5mg weekly) along with decrease in Basaglar dose.      Current Diabetes Medication Regimen:   Basaglar 14 units daily in the evening   Jardiance 25mg daily in the morning   Metformin 1000mg twice daily with meals   Ozempic 0.5mg weekly (Fridays) - has used 2 doses of 0.5mg    Iris Benjamin, PharmD, BCACP, BC-ADM, CDCES  Clinical Specialty Pharmacist, Endocrinology  5/28/2025  09:43 EDT     Name: Sergio Stanford  YOB: 1965  Report Period:  05/15/2025 - 05/28/2025 (14 days)  Generated: 05/28/2025  Time CGM Active: 93%      Glucose Statistics and Targets  Average Glucose: 149 mg/dL  Glucose Management Indicator (GMI): 6.9%  Glucose Variability (%CV): 28.0%  Target Range: 70 - 180 mg/dL      Time in Ranges  Very High: >250 mg/dL --- 0%  High: 181 - 250 mg/dL --- 24%  Target Range: 70 - 180 mg/dL --- 76%  Low: 54 - 69 mg/dL --- 0%  Very Low: <54 mg/dL --- 0%

## 2025-05-28 NOTE — PROGRESS NOTES
Specialty Pharmacy Patient Management Program  One-Time Clinical Outreach     Sergio Stanford is a 59 y.o. male seen by an Endocrinology provider for Type 2 Diabetes and enrolled in the Endocrinology Patient Management program offered by [facility].      Open Medication Therapy Problems  Type 2 diabetes mellitus with hyperglycemia, with long-term current use of insulin   1 Current Medication: Semaglutide,0.25 or 0.5MG/DOS, (Ozempic, 0.25 or 0.5 MG/DOSE,) 2 MG/3ML solution pen-injector   Current Medication Sig: Inject 0.25 mg under the skin into the appropriate area as directed 1 (One) Time Per Week for 28 days, THEN 0.5 mg 1 (One) Time Per Week for 70 days.   Rationale: Medication requires monitoring - Needs additional monitoring - Effectiveness   Identified Date: 5/28/2025   Note: 5/28/25 - 9:29am - Phone call with patient     Contacted patient to discuss tolerability of Ozempic following dose increase. Patient reports taking 2 doses of Ozempic 0.5mg so far and has tolerated well without adverse effects. . Patient states they feel comfortable continuing with Ozempic 0.5mg at this time.     Patient has had some hypoglycemia with BG as low as 59mg/dL. Patient states he's been using variable amounts of Basaglar depending on what he's eating, for example, if having more sugary snacks in the evening he will use a higher dose. He states most often he is using Basaglar 14 units daily before bed (~9pm). Recommended against adjusting basal insulin dose based on evening snack given duration of action. Patient did have a low blood sugar event this morning while driving to work and had to stop at a gas station to get a snack and reports only using Basaglar 10units yesterday evening. Patient agreeable to using Basaglar 12 units daily.     Patient is achieving good glycemic control and meeting goals for time in range as recommended by the American Diabetes Association Standards of Care. Will review CGM  report as well as diabetes medication regimen to see if provider would recommend increase in Ozempic dose to 1mg weekly (from 0.5mg weekly) along with decrease in Basaglar dose.       Current Diabetes Medication Regimen:   Basaglar 14 units daily in the evening   Jardiance 25mg daily in the morning   Metformin 1000mg twice daily with meals   Ozempic 0.5mg weekly (Fridays) - has used 2 doses of 0.5mg            _________________________________________________________________  ----- Message from Michelle Jackson sent at 5/28/2025  9:57 AM EDT -----  He could do the ozempic 1mg and stop the insulin all together until I see him and we can assess at that time if we need to resume the insulin or notOr we can leave everything the same until I see him. Whichever he thinks would work best for him     _________________________________________________________________  5/28/2025 10:09 EDT   - Phone call     Contacted patient to review recommendations from provider. Patient agreeable to increase Ozempic dose and stop insulin until he is seen for his visit on 6/24/25.     Starting Friday 6/13 - Plan:  INCREASE to Ozempic 1mg weekly   Patient will use two injections of Ozempic 0.5mg on the same day rotating injection sites  STOP Basaglar   CONTINUE Jardiance 25mg daily in the morning   CONTINUE Metformin 1000mg twice daily with meals      All questions answered, patient in agreement with plan, and patient expressed understanding .               Iris eBnjamin, PharmD, BCACP, BC-ADM, CDCES  Clinical Specialty Pharmacist, Endocrinology  5/28/2025  10:18 EDT

## 2025-06-11 ENCOUNTER — SPECIALTY PHARMACY (OUTPATIENT)
Dept: ENDOCRINOLOGY | Age: 60
End: 2025-06-11
Payer: COMMERCIAL

## 2025-06-11 NOTE — PROGRESS NOTES
Specialty Pharmacy Patient Management Program  One-Time Clinical Outreach     Sergio Stanford is a 59 y.o. male seen by an Endocrinology provider for Type 2 Diabetes and enrolled in the Endocrinology Patient Management program offered by [facility].      Open Medication Therapy Problems  Type 2 diabetes mellitus with hyperglycemia, with long-term current use of insulin   1 Current Medication: Semaglutide,0.25 or 0.5MG/DOS, (Ozempic, 0.25 or 0.5 MG/DOSE,) 2 MG/3ML solution pen-injector   Current Medication Sig: Inject 0.25 mg under the skin into the appropriate area as directed 1 (One) Time Per Week for 28 days, THEN 0.5 mg 1 (One) Time Per Week for 70 days.   Rationale: Medication requires monitoring - Needs additional monitoring - Effectiveness   Identified Date: 5/28/2025   Note: 5/28/25 - 9:29am - Phone call with patient     Contacted patient to discuss tolerability of Ozempic following dose increase. Patient reports taking 2 doses of Ozempic 0.5mg so far and has tolerated well without adverse effects. . Patient states they feel comfortable continuing with Ozempic 0.5mg at this time.     Patient has had some hypoglycemia with BG as low as 59mg/dL. Patient states he's been using variable amounts of Basaglar depending on what he's eating, for example, if having more sugary snacks in the evening he will use a higher dose. He states most often he is using Basaglar 14 units daily before bed (~9pm). Recommended against adjusting basal insulin dose based on evening snack given duration of action. Patient did have a low blood sugar event this morning while driving to work and had to stop at a gas station to get a snack and reports only using Basaglar 10units yesterday evening. Patient agreeable to using Basaglar 12 units daily.     Patient is achieving good glycemic control and meeting goals for time in range as recommended by the American Diabetes Association Standards of Care. Will review CGM  report as well as diabetes medication regimen to see if provider would recommend increase in Ozempic dose to 1mg weekly (from 0.5mg weekly) along with decrease in Basaglar dose.       Current Diabetes Medication Regimen:   Basaglar 14 units daily in the evening   Jardiance 25mg daily in the morning   Metformin 1000mg twice daily with meals   Ozempic 0.5mg weekly (Fridays) - has used 2 doses of 0.5mg            _________________________________________________________________  ----- Message from Michelle Jackson sent at 5/28/2025  9:57 AM EDT -----  He could do the ozempic 1mg and stop the insulin all together until I see him and we can assess at that time if we need to resume the insulin or notOr we can leave everything the same until I see him. Whichever he thinks would work best for him     _________________________________________________________________  5/28/2025 10:09 EDT   - Phone call     Contacted patient to review recommendations from provider. Patient agreeable to increase Ozempic dose and stop insulin until he is seen for his visit on 6/24/25.     Starting Friday 6/13 - Plan:  INCREASE to Ozempic 1mg weekly   Patient will use two injections of Ozempic 0.5mg on the same day rotating injection sites  STOP Basaglar   CONTINUE Jardiance 25mg daily in the morning   CONTINUE Metformin 1000mg twice daily with meals      All questions answered, patient in agreement with plan, and patient expressed understanding .       _________________________________________________________________  6/11/25 - 12:10 - phone call  Contacted patient to remind him of dose adjustment for Ozempic and discontinuation of Basaglar. Patient states he increased Ozempic dose last week to 0.75mg (using one injection of 0.25mg and one of 0.5mg) to see how he tolerated before increasing to 1mg. He reports tolerating this well without adverse effects. He has not discontinued insulin yet. Recommended patient proceed with plan to increase Ozempic  this Friday and discontinue insulin. Patient will be seeing his provider in 2 weeks and can review CGM data and glycemic control at that time.     All questions answered, patient in agreement with plan, and patient expressed understanding .             Iris Benjamin, PharmD, BCACP, BC-ADM, [facility]  Clinical Specialty Pharmacist, Endocrinology  6/11/2025  12:07 EDT

## 2025-06-19 ENCOUNTER — TREATMENT (OUTPATIENT)
Dept: ENDOCRINOLOGY | Age: 60
End: 2025-06-19
Payer: COMMERCIAL

## 2025-06-19 ENCOUNTER — SPECIALTY PHARMACY (OUTPATIENT)
Dept: ENDOCRINOLOGY | Age: 60
End: 2025-06-19
Payer: COMMERCIAL

## 2025-06-19 DIAGNOSIS — Z79.4 TYPE 2 DIABETES MELLITUS WITH HYPERGLYCEMIA, WITH LONG-TERM CURRENT USE OF INSULIN: Primary | ICD-10-CM

## 2025-06-19 DIAGNOSIS — E11.65 TYPE 2 DIABETES MELLITUS WITH HYPERGLYCEMIA, WITH LONG-TERM CURRENT USE OF INSULIN: Primary | ICD-10-CM

## 2025-06-19 PROCEDURE — 95251 CONT GLUC MNTR ANALYSIS I&R: CPT | Performed by: NURSE PRACTITIONER

## 2025-06-19 NOTE — PROGRESS NOTES
Specialty Pharmacy Patient Management Program  One-Time Clinical Outreach     Sergio Stanford is a 59 y.o. male seen by an Endocrinology provider for Type 2 Diabetes and enrolled in the Endocrinology Patient Management program offered by [facility].      Open Medication Therapy Problems  Type 2 diabetes mellitus with hyperglycemia, with long-term current use of insulin   1 Current Medication: Semaglutide,0.25 or 0.5MG/DOS, (Ozempic, 0.25 or 0.5 MG/DOSE,) 2 MG/3ML solution pen-injector   Current Medication Sig: Inject 0.25 mg under the skin into the appropriate area as directed 1 (One) Time Per Week for 28 days, THEN 0.5 mg 1 (One) Time Per Week for 70 days.   Rationale: Medication requires monitoring - Needs additional monitoring - Effectiveness   Identified Date: 5/28/2025   Note: 5/28/25 - 9:29am - Phone call with patient     Contacted patient to discuss tolerability of Ozempic following dose increase. Patient reports taking 2 doses of Ozempic 0.5mg so far and has tolerated well without adverse effects. . Patient states they feel comfortable continuing with Ozempic 0.5mg at this time.     Patient has had some hypoglycemia with BG as low as 59mg/dL. Patient states he's been using variable amounts of Basaglar depending on what he's eating, for example, if having more sugary snacks in the evening he will use a higher dose. He states most often he is using Basaglar 14 units daily before bed (~9pm). Recommended against adjusting basal insulin dose based on evening snack given duration of action. Patient did have a low blood sugar event this morning while driving to work and had to stop at a gas station to get a snack and reports only using Basaglar 10units yesterday evening. Patient agreeable to using Basaglar 12 units daily.     Patient is achieving good glycemic control and meeting goals for time in range as recommended by the American Diabetes Association Standards of Care. Will review CGM  report as well as diabetes medication regimen to see if provider would recommend increase in Ozempic dose to 1mg weekly (from 0.5mg weekly) along with decrease in Basaglar dose.       Current Diabetes Medication Regimen:   Basaglar 14 units daily in the evening   Jardiance 25mg daily in the morning   Metformin 1000mg daily  Ozempic 0.5mg weekly (Fridays) - has used 2 doses of 0.5mg            _________________________________________________________________  ----- Message from Michelle Jackson sent at 5/28/2025  9:57 AM EDT -----  He could do the ozempic 1mg and stop the insulin all together until I see him and we can assess at that time if we need to resume the insulin or notOr we can leave everything the same until I see him. Whichever he thinks would work best for him     _________________________________________________________________  5/28/2025 10:09 EDT   - Phone call     Contacted patient to review recommendations from provider. Patient agreeable to increase Ozempic dose and stop insulin until he is seen for his visit on 6/24/25.     Starting Friday 6/13 - Plan:  INCREASE to Ozempic 1mg weekly   Patient will use two injections of Ozempic 0.5mg on the same day rotating injection sites  STOP Basaglar   CONTINUE Jardiance 25mg daily in the morning   CONTINUE Metformin 1000mg daily     All questions answered, patient in agreement with plan, and patient expressed understanding .       _________________________________________________________________  6/11/25 - 12:10 - phone call  Contacted patient to remind him of dose adjustment for Ozempic and discontinuation of Basaglar. Patient states he increased Ozempic dose last week to 0.75mg (using one injection of 0.25mg and one of 0.5mg) to see how he tolerated before increasing to 1mg. He reports tolerating this well without adverse effects. He has not discontinued insulin yet. Recommended patient proceed with plan to increase Ozempic this Friday and discontinue  insulin. Patient will be seeing his provider in 2 weeks and can review CGM data and glycemic control at that time.     All questions answered, patient in agreement with plan, and patient expressed understanding .     _________________________________________________________________   6/19/25 - 2:32pm - phone call with joselyn   Patient contacted clinic to report some hypoglycemia, especially low blood sugars overnight. Reviewed patients CGM report which shows he's had 5 hypoglycemic events in the last 4 days with low blood sugars as low as 56mg/dL. Patient reports Increasing Ozempic to 1mg weekly last week as instructed and stopped insulin at the same time. Patient states he has not changed his eating habits. He states even had a milkshake before bed yesterday to help try to prevent low blood sugars, but this was unsuccessful.     Current Diabetes Medication Regimen:   Jardiance 25mg daily in the morning   Metformin 1000mg daily  Ozempic 1mg weekly (Fridays) - has used 1 doses of 1mg    Will review with provider to determine next steps.                     Iris Benjamin, PharmD, BCACP, BC-ADM, CDCES  Clinical Specialty Pharmacist, Endocrinology  6/19/2025  14:33 EDT

## 2025-06-20 NOTE — PROGRESS NOTES
Specialty Pharmacy Patient Management Program  One-Time Clinical Outreach     Sergio Stanford is a 59 y.o. male seen by an Endocrinology provider for Type 2 Diabetes and enrolled in the Endocrinology Patient Management program offered by [facility].      Open Medication Therapy Problems  Type 2 diabetes mellitus with hyperglycemia, with long-term current use of insulin   1 Current Medication: Semaglutide,0.25 or 0.5MG/DOS, (Ozempic, 0.25 or 0.5 MG/DOSE,) 2 MG/3ML solution pen-injector   Current Medication Sig: Inject 0.25 mg under the skin into the appropriate area as directed 1 (One) Time Per Week for 28 days, THEN 0.5 mg 1 (One) Time Per Week for 70 days.   Rationale: Medication requires monitoring - Needs additional monitoring - Effectiveness   Identified Date: 5/28/2025   Note: 5/28/25 - 9:29am - Phone call with patient     Contacted patient to discuss tolerability of Ozempic following dose increase. Patient reports taking 2 doses of Ozempic 0.5mg so far and has tolerated well without adverse effects. . Patient states they feel comfortable continuing with Ozempic 0.5mg at this time.     Patient has had some hypoglycemia with BG as low as 59mg/dL. Patient states he's been using variable amounts of Basaglar depending on what he's eating, for example, if having more sugary snacks in the evening he will use a higher dose. He states most often he is using Basaglar 14 units daily before bed (~9pm). Recommended against adjusting basal insulin dose based on evening snack given duration of action. Patient did have a low blood sugar event this morning while driving to work and had to stop at a gas station to get a snack and reports only using Basaglar 10units yesterday evening. Patient agreeable to using Basaglar 12 units daily.     Patient is achieving good glycemic control and meeting goals for time in range as recommended by the American Diabetes Association Standards of Care. Will review CGM  report as well as diabetes medication regimen to see if provider would recommend increase in Ozempic dose to 1mg weekly (from 0.5mg weekly) along with decrease in Basaglar dose.       Current Diabetes Medication Regimen:   Basaglar 14 units daily in the evening   Jardiance 25mg daily in the morning   Metformin 1000mg daily  Ozempic 0.5mg weekly (Fridays) - has used 2 doses of 0.5mg            _________________________________________________________________  ----- Message from Michelle Jackson sent at 5/28/2025  9:57 AM EDT -----  He could do the ozempic 1mg and stop the insulin all together until I see him and we can assess at that time if we need to resume the insulin or notOr we can leave everything the same until I see him. Whichever he thinks would work best for him     _________________________________________________________________  5/28/2025 10:09 EDT   - Phone call     Contacted patient to review recommendations from provider. Patient agreeable to increase Ozempic dose and stop insulin until he is seen for his visit on 6/24/25.     Starting Friday 6/13 - Plan:  INCREASE to Ozempic 1mg weekly   Patient will use two injections of Ozempic 0.5mg on the same day rotating injection sites  STOP Basaglar   CONTINUE Jardiance 25mg daily in the morning   CONTINUE Metformin 1000mg daily     All questions answered, patient in agreement with plan, and patient expressed understanding .       _________________________________________________________________  6/11/25 - 12:10 - phone call  Contacted patient to remind him of dose adjustment for Ozempic and discontinuation of Basaglar. Patient states he increased Ozempic dose last week to 0.75mg (using one injection of 0.25mg and one of 0.5mg) to see how he tolerated before increasing to 1mg. He reports tolerating this well without adverse effects. He has not discontinued insulin yet. Recommended patient proceed with plan to increase Ozempic this Friday and discontinue  insulin. Patient will be seeing his provider in 2 weeks and can review CGM data and glycemic control at that time.     All questions answered, patient in agreement with plan, and patient expressed understanding .     _________________________________________________________________   6/19/25 - 2:32pm - phone call with joselyn   Patient contacted clinic to report some hypoglycemia, especially low blood sugars overnight. Reviewed patients CGM report which shows he's had 5 hypoglycemic events in the last 4 days with low blood sugars as low as 56mg/dL. Patient reports Increasing Ozempic to 1mg weekly last week as instructed and stopped insulin at the same time. Patient states he has not changed his eating habits. He states even had a milkshake before bed yesterday to help try to prevent low blood sugars, but this was unsuccessful.     Will review with provider to determine next steps.           _________________________________________________________________    6/19/25  4:57 PM - Michelle Jackson APRN to Me  Yes, we can reduce metformin to 50mmg QD or stop altogether, whichever he prefers     _________________________________________________________________   6/20/25 12:15 - phone call with pt     Reviewed recommendations from provider. He skipped Jardiance this morning and took the metformin. Discussed benefits of both medications and why we'd prefer to continue Jardiance vs Metformin. Patient will make recommended changes tomorrow and review with provider at appt next week on 6/24.     All questions answered, patient in agreement with plan, and patient expressed understanding .             Iris Benjamin, PharmD, BCACP, BC-ADM, CDCES  Clinical Specialty Pharmacist, Endocrinology  6/20/2025  12:16 EDT

## 2025-06-23 NOTE — PROGRESS NOTES
Cgm review 6/6/25-6/19/25  Very high 1%  High 20%  Time in range 78%  1% low  GMI 6.8%  Average glucose 144      Starting Friday 6/13 - Plan:  INCREASE to Ozempic 1mg weekly   Patient will use two injections of Ozempic 0.5mg on the same day rotating injection sites  STOP Basaglar   CONTINUE Jardiance 25mg daily in the morning   CONTINUE Metformin 1000mg twice daily with meals

## 2025-06-24 ENCOUNTER — OFFICE VISIT (OUTPATIENT)
Dept: ENDOCRINOLOGY | Age: 60
End: 2025-06-24
Payer: COMMERCIAL

## 2025-06-24 VITALS
HEIGHT: 71 IN | BODY MASS INDEX: 21.22 KG/M2 | OXYGEN SATURATION: 96 % | WEIGHT: 151.6 LBS | DIASTOLIC BLOOD PRESSURE: 68 MMHG | SYSTOLIC BLOOD PRESSURE: 122 MMHG | HEART RATE: 93 BPM | TEMPERATURE: 98.1 F

## 2025-06-24 DIAGNOSIS — E11.65 TYPE 2 DIABETES MELLITUS WITH HYPERGLYCEMIA, WITH LONG-TERM CURRENT USE OF INSULIN: Primary | ICD-10-CM

## 2025-06-24 DIAGNOSIS — E11.42 DIABETIC PERIPHERAL NEUROPATHY: ICD-10-CM

## 2025-06-24 DIAGNOSIS — Z89.9 H/O AMPUTATION: ICD-10-CM

## 2025-06-24 DIAGNOSIS — E11.319 DIABETIC RETINOPATHY ASSOCIATED WITH TYPE 2 DIABETES MELLITUS, MACULAR EDEMA PRESENCE UNSPECIFIED, UNSPECIFIED LATERALITY, UNSPECIFIED RETINOPATHY SEVERITY: ICD-10-CM

## 2025-06-24 DIAGNOSIS — E78.2 MIXED HYPERLIPIDEMIA: ICD-10-CM

## 2025-06-24 DIAGNOSIS — Z79.4 TYPE 2 DIABETES MELLITUS WITH HYPERGLYCEMIA, WITH LONG-TERM CURRENT USE OF INSULIN: Primary | ICD-10-CM

## 2025-06-24 DIAGNOSIS — I73.9 PVD (PERIPHERAL VASCULAR DISEASE): ICD-10-CM

## 2025-06-24 LAB — GLUCOSE BLDC GLUCOMTR-MCNC: 150 MG/DL (ref 70–130)

## 2025-06-24 RX ORDER — SEMAGLUTIDE 1.34 MG/ML
1 INJECTION, SOLUTION SUBCUTANEOUS WEEKLY
COMMUNITY
End: 2025-06-24 | Stop reason: SDUPTHER

## 2025-06-24 RX ORDER — INSULIN GLARGINE 100 [IU]/ML
20 INJECTION, SOLUTION SUBCUTANEOUS NIGHTLY
Qty: 15 ML | Refills: 0 | Status: SHIPPED | OUTPATIENT
Start: 2025-06-24

## 2025-06-24 RX ORDER — SEMAGLUTIDE 1.34 MG/ML
1 INJECTION, SOLUTION SUBCUTANEOUS WEEKLY
Qty: 9 ML | Refills: 1 | Status: SHIPPED | OUTPATIENT
Start: 2025-06-24

## 2025-06-24 RX ORDER — HYDROCHLOROTHIAZIDE 12.5 MG/1
1 CAPSULE ORAL
Qty: 6 EACH | Refills: 1 | Status: SHIPPED | OUTPATIENT
Start: 2025-06-24

## 2025-06-24 NOTE — PROGRESS NOTES
"Chief Complaint  Diabetes    Subjective        Sergio Stanford presents to [facility] ENDOCRINOLOGY  History of Present Illness    Patient is seen for follow up today from new patient visit 3/2025 sent by Bouchra Masters APRN for E11.9 (ICD-10-CM) - Diabetes     Diabetes Type 2, age 37  Known complications: Retinopathy, PAD, neuropathy  Hypothyroid: levothyroxine 75mcg daily     Starting Friday 6/13 - Plan:  INCREASE to Ozempic 1mg weekly   Patient will use two injections of Ozempic 0.5mg on the same day rotating injection sites  STOP Basaglar   CONTINUE Jardiance 25mg daily in the morning   CONTINUE Metformin 1000mg twice daily with meals     Today patient reports that on Sunday he resumed basaglar 20u QD as BS were too high after meals  Reports he was never doing metformin 1000mg BID, only ever once a day   Still on Jardiance 25mg QD     6/11/25-6/24/25  Avg glu 147  GMI 6.8%  Time in range 74%  Low 2%  High 21%   very high 3%         Objective   Vital Signs:  /68   Pulse 93   Temp 98.1 °F (36.7 °C) (Oral)   Ht 180.3 cm (70.98\")   Wt 68.8 kg (151 lb 9.6 oz)   SpO2 96%   BMI 21.15 kg/m²   Estimated body mass index is 21.15 kg/m² as calculated from the following:    Height as of this encounter: 180.3 cm (70.98\").    Weight as of this encounter: 68.8 kg (151 lb 9.6 oz).    BMI is within normal parameters. No other follow-up for BMI required.      Physical Exam  Vitals reviewed.   Constitutional:       General: He is not in acute distress.  HENT:      Head: Normocephalic and atraumatic.   Cardiovascular:      Rate and Rhythm: Normal rate.   Pulmonary:      Effort: Pulmonary effort is normal. No respiratory distress.   Musculoskeletal:         General: No signs of injury. Normal range of motion.      Cervical back: Normal range of motion and neck supple.   Skin:     General: Skin is warm and dry.   Neurological:      Mental Status: He is alert and oriented to person, place, and time. " Mental status is at baseline.   Psychiatric:         Mood and Affect: Mood normal.         Behavior: Behavior normal.         Thought Content: Thought content normal.         Judgment: Judgment normal.          Result Review :  The following data was reviewed by: HUSSEIN Milner on 06/24/2025:  Common labs          3/4/2025    16:40 3/31/2025    07:50 6/24/2025    16:12   Common Labs   Glucose   168    BUN   23.0    Creatinine   1.27    Sodium   144    Potassium   4.1    Chloride   106    Calcium   10.2    Albumin   4.6    Total Bilirubin   0.9    Alkaline Phosphatase   71    AST (SGOT)   13    ALT (SGPT)   10    Total Cholesterol 158         Triglycerides 87         HDL Cholesterol 50         LDL Cholesterol  90         Hemoglobin A1C  9.50  7.80       Details          This result is from an external source.                       Assessment and Plan   Diagnoses and all orders for this visit:    1. Type 2 diabetes mellitus with hyperglycemia, with long-term current use of insulin (Primary)  -     Insulin Glargine (BASAGLAR KWIKPEN) 100 UNIT/ML injection pen; Inject 20 Units under the skin into the appropriate area as directed Every Night.  Dispense: 15 mL; Refill: 0  -     metFORMIN (GLUCOPHAGE) 1000 MG tablet; Take 1 tablet by mouth Daily With Breakfast.  Dispense: 90 tablet; Refill: 1  -     Continuous Glucose Sensor (FreeStyle Joshua 3 Plus Sensor); Use 1 Sensor Every 15 (Fifteen) Days.  Dispense: 6 each; Refill: 1  -     Hemoglobin A1c  -     Comprehensive Metabolic Panel  -     POC Glucose Fingerstick  -     Semaglutide, 1 MG/DOSE, (Ozempic, 1 MG/DOSE,) 4 MG/3ML solution pen-injector; Inject 1 mg under the skin into the appropriate area as directed 1 (One) Time Per Week.  Dispense: 9 mL; Refill: 1    2. PVD (peripheral vascular disease)    3. H/O amputation    4. Mixed hyperlipidemia    5. Diabetic retinopathy associated with type 2 diabetes mellitus, macular edema presence unspecified, unspecified  laterality, unspecified retinopathy severity    6. Diabetic peripheral neuropathy             Follow Up   Return in about 4 months (around 10/24/2025).    Cgm reviewed, much better  A1c improved and in much better range  Continue current plan with ozempic 1mg weekly, basaglar 20u QD, metformin 1000mg QD and jardiance 25mg QD  Continue arb and statin  Continue CGM for hypoglycemia monitoring and prevention as well as PP hyperglycemia monitoring     Patient was given instructions and counseling regarding his condition or for health maintenance advice. Please see specific information pulled into the AVS if appropriate.       Michelle Jackson, APRN         27-Nov-2020 19:45

## 2025-06-25 ENCOUNTER — SPECIALTY PHARMACY (OUTPATIENT)
Dept: ENDOCRINOLOGY | Age: 60
End: 2025-06-25
Payer: COMMERCIAL

## 2025-06-25 LAB
ALBUMIN SERPL-MCNC: 4.6 G/DL (ref 3.5–5.2)
ALBUMIN/GLOB SERPL: 1.8 G/DL
ALP SERPL-CCNC: 71 U/L (ref 39–117)
ALT SERPL-CCNC: 10 U/L (ref 1–41)
AST SERPL-CCNC: 13 U/L (ref 1–40)
BILIRUB SERPL-MCNC: 0.9 MG/DL (ref 0–1.2)
BUN SERPL-MCNC: 23 MG/DL (ref 8–23)
BUN/CREAT SERPL: 18.1 (ref 7–25)
CALCIUM SERPL-MCNC: 10.2 MG/DL (ref 8.6–10.5)
CHLORIDE SERPL-SCNC: 106 MMOL/L (ref 98–107)
CO2 SERPL-SCNC: 25.4 MMOL/L (ref 22–29)
CREAT SERPL-MCNC: 1.27 MG/DL (ref 0.76–1.27)
EGFRCR SERPLBLD CKD-EPI 2021: 64.7 ML/MIN/1.73
GLOBULIN SER CALC-MCNC: 2.6 GM/DL
GLUCOSE SERPL-MCNC: 168 MG/DL (ref 65–99)
HBA1C MFR BLD: 7.8 % (ref 4.8–5.6)
POTASSIUM SERPL-SCNC: 4.1 MMOL/L (ref 3.5–5.2)
PROT SERPL-MCNC: 7.2 G/DL (ref 6–8.5)
SODIUM SERPL-SCNC: 144 MMOL/L (ref 136–145)

## 2025-06-25 NOTE — PROGRESS NOTES
Specialty Pharmacy Patient Management Program  Endocrinology Refill Outreach      Sergio is a 59 y.o. male contacted today regarding refills of his medication(s).    Specialty medication(s) and dose(s) confirmed: FreeStyle Joshua 3 Plus CGM Sensors, Ozempic 1mg weekly     Refill Questions      Flowsheet Row Most Recent Value   Changes to allergies? No   Changes to medications? No   New conditions or infections since last clinic visit No   Unplanned office visit, urgent care, ED, or hospital admission in the last 4 weeks  No   How does patient/caregiver feel medication is working? Good   Financial problems or insurance changes  No   Since the previous refill, were any specialty medication doses or scheduled injections missed or delayed?  No   Does this patient require a clinical escalation to a pharmacist? No          Delivery Questions      Flowsheet Row Most Recent Value   Delivery method UPS   Delivery address verified with patient/caregiver? Yes   Delivery address Home   Number of medications in delivery 2   Medication(s) being filled and delivered Semaglutide (Ozempic (0.25 or 0.5 MG/DOSE)), Continuous Glucose Sensor (FreeStyle Joshua 3 Plus Sensor)   Copay verified? Yes   Copay amount $132.36   Copay form of payment Credit/debit on file   Delivery Date Selection 06/26/25   Signature Required No            Follow-Up: 77 days     Iris Benjamin, PharmD, BCACP, BC-ADM, CDCES  Clinical Specialty Pharmacist, Endocrinology  6/25/2025  11:46 EDT

## 2025-07-15 ENCOUNTER — SPECIALTY PHARMACY (OUTPATIENT)
Dept: ENDOCRINOLOGY | Age: 60
End: 2025-07-15
Payer: COMMERCIAL

## 2025-07-15 NOTE — PROGRESS NOTES
Specialty Pharmacy Patient Management Program  One-Time Clinical Outreach     Sergio Stanford is a 59 y.o. male seen by an Endocrinology provider for Type 2 Diabetes and enrolled in the Endocrinology Patient Management program offered by [facility].      Contacted patient to discuss recent office visit on 7/9 for vomiting. Patient reports that he had nausea and was vomiting for ~2 days, and he feels this was related severe poison oak/ivy which he had over most of his body. He states he started feeling better (but not great) by Friday, 7/11, but was advised by the provider he saw on 7/9 to skip his Ozempic injection that day to avoid worsening symptoms.   Patient states the poison oak/ivy is improving and he has not had any further vomiting. Patient will take his next scheduled Ozempic dose this Friday, 7/18. Will follow-up with patient next week to ensure his GI symptoms did not return and that this was likely just due to poison oak/ivy.     All questions answered and patient in agreement with plan.     Iris Benjamin, PharmD, BCACP, BC-ADM, [facility]  Clinical Specialty Pharmacist, Endocrinology  7/15/2025  11:11 EDT

## 2025-07-24 NOTE — TELEPHONE ENCOUNTER
Specialty Pharmacy Patient Management Program  Prescription Refill Request     Patient currently fills medications at  Pharmacy. Needing refill(s) on the following:      Requested Prescriptions     Pending Prescriptions Disp Refills    empagliflozin (JARDIANCE) 25 MG tablet tablet 90 tablet 0     Sig: Take 1 tablet by mouth Daily.       Last visit: 06/24/25    Next visit: 10/30/25    Pended for HUSSEIN Milner to review, and approve if appropriate.       Iris Benjamin, PharmD, BCACP, BC-ADM, [facility]  Clinical Specialty Pharmacist, Endocrinology  7/24/2025  14:41 EDT

## 2025-07-30 ENCOUNTER — SPECIALTY PHARMACY (OUTPATIENT)
Dept: ENDOCRINOLOGY | Age: 60
End: 2025-07-30
Payer: COMMERCIAL

## 2025-07-30 RX ORDER — SEMAGLUTIDE 0.68 MG/ML
0.5 INJECTION, SOLUTION SUBCUTANEOUS WEEKLY
Qty: 3 ML | Refills: 0 | Status: SHIPPED | OUTPATIENT
Start: 2025-07-30

## 2025-08-05 ENCOUNTER — SPECIALTY PHARMACY (OUTPATIENT)
Dept: ENDOCRINOLOGY | Age: 60
End: 2025-08-05
Payer: COMMERCIAL

## 2025-08-21 ENCOUNTER — SPECIALTY PHARMACY (OUTPATIENT)
Dept: ENDOCRINOLOGY | Age: 60
End: 2025-08-21
Payer: COMMERCIAL

## 2025-08-21 ENCOUNTER — TREATMENT (OUTPATIENT)
Dept: ENDOCRINOLOGY | Age: 60
End: 2025-08-21
Payer: COMMERCIAL

## 2025-08-25 ENCOUNTER — SPECIALTY PHARMACY (OUTPATIENT)
Dept: ENDOCRINOLOGY | Age: 60
End: 2025-08-25
Payer: COMMERCIAL